# Patient Record
Sex: MALE | Race: WHITE | NOT HISPANIC OR LATINO | Employment: FULL TIME | URBAN - METROPOLITAN AREA
[De-identification: names, ages, dates, MRNs, and addresses within clinical notes are randomized per-mention and may not be internally consistent; named-entity substitution may affect disease eponyms.]

---

## 2024-05-13 ENCOUNTER — APPOINTMENT (OUTPATIENT)
Dept: GENERAL RADIOLOGY | Facility: HOSPITAL | Age: 19
End: 2024-05-13
Payer: COMMERCIAL

## 2024-05-13 ENCOUNTER — HOSPITAL ENCOUNTER (EMERGENCY)
Facility: HOSPITAL | Age: 19
Discharge: HOME | End: 2024-05-13
Attending: EMERGENCY MEDICINE | Admitting: EMERGENCY MEDICINE
Payer: COMMERCIAL

## 2024-05-13 VITALS
TEMPERATURE: 99.2 F | DIASTOLIC BLOOD PRESSURE: 81 MMHG | BODY MASS INDEX: 22.46 KG/M2 | HEIGHT: 74 IN | WEIGHT: 175 LBS | OXYGEN SATURATION: 98 % | SYSTOLIC BLOOD PRESSURE: 123 MMHG | HEART RATE: 74 BPM | RESPIRATION RATE: 16 BRPM

## 2024-05-13 DIAGNOSIS — R06.02 SHORTNESS OF BREATH: ICD-10-CM

## 2024-05-13 DIAGNOSIS — R09.1 PLEURISY: Primary | ICD-10-CM

## 2024-05-13 LAB
ALBUMIN SERPL-MCNC: 5.1 G/DL (ref 3.5–5.2)
ALBUMIN/GLOB SERPL: 2 G/DL
ALP SERPL-CCNC: 64 U/L (ref 39–117)
ALT SERPL W P-5'-P-CCNC: 15 U/L (ref 1–41)
ANION GAP SERPL CALCULATED.3IONS-SCNC: 14 MMOL/L (ref 5–15)
AST SERPL-CCNC: 16 U/L (ref 1–40)
BASOPHILS # BLD AUTO: 0.04 10*3/MM3 (ref 0–0.2)
BASOPHILS NFR BLD AUTO: 0.6 % (ref 0–1.5)
BILIRUB SERPL-MCNC: 1.6 MG/DL (ref 0–1.2)
BUN SERPL-MCNC: 9 MG/DL (ref 6–20)
BUN/CREAT SERPL: 11.3 (ref 7–25)
CALCIUM SPEC-SCNC: 9.7 MG/DL (ref 8.6–10.5)
CHLORIDE SERPL-SCNC: 104 MMOL/L (ref 98–107)
CO2 SERPL-SCNC: 22 MMOL/L (ref 22–29)
CREAT SERPL-MCNC: 0.8 MG/DL (ref 0.76–1.27)
D DIMER PPP FEU-MCNC: <0.27 MCGFEU/ML (ref 0–0.5)
DEPRECATED RDW RBC AUTO: 38.8 FL (ref 37–54)
EGFRCR SERPLBLD CKD-EPI 2021: 129.1 ML/MIN/1.73
EOSINOPHIL # BLD AUTO: 0.03 10*3/MM3 (ref 0–0.4)
EOSINOPHIL NFR BLD AUTO: 0.4 % (ref 0.3–6.2)
ERYTHROCYTE [DISTWIDTH] IN BLOOD BY AUTOMATED COUNT: 11.7 % (ref 12.3–15.4)
GEN 5 2HR TROPONIN T REFLEX: 7 NG/L
GLOBULIN UR ELPH-MCNC: 2.5 GM/DL
GLUCOSE SERPL-MCNC: 86 MG/DL (ref 65–99)
HCT VFR BLD AUTO: 44.5 % (ref 37.5–51)
HGB BLD-MCNC: 15.7 G/DL (ref 13–17.7)
HOLD SPECIMEN: NORMAL
HOLD SPECIMEN: NORMAL
IMM GRANULOCYTES # BLD AUTO: 0.02 10*3/MM3 (ref 0–0.05)
IMM GRANULOCYTES NFR BLD AUTO: 0.3 % (ref 0–0.5)
LYMPHOCYTES # BLD AUTO: 1.67 10*3/MM3 (ref 0.7–3.1)
LYMPHOCYTES NFR BLD AUTO: 23.1 % (ref 19.6–45.3)
MCH RBC QN AUTO: 32.3 PG (ref 26.6–33)
MCHC RBC AUTO-ENTMCNC: 35.3 G/DL (ref 31.5–35.7)
MCV RBC AUTO: 91.6 FL (ref 79–97)
MONOCYTES # BLD AUTO: 0.47 10*3/MM3 (ref 0.1–0.9)
MONOCYTES NFR BLD AUTO: 6.5 % (ref 5–12)
NEUTROPHILS NFR BLD AUTO: 5.01 10*3/MM3 (ref 1.7–7)
NEUTROPHILS NFR BLD AUTO: 69.1 % (ref 42.7–76)
NRBC BLD AUTO-RTO: 0 /100 WBC (ref 0–0.2)
PLATELET # BLD AUTO: 288 10*3/MM3 (ref 140–450)
PMV BLD AUTO: 10.1 FL (ref 6–12)
POTASSIUM SERPL-SCNC: 3.2 MMOL/L (ref 3.5–5.2)
PROT SERPL-MCNC: 7.6 G/DL (ref 6–8.5)
RBC # BLD AUTO: 4.86 10*6/MM3 (ref 4.14–5.8)
SODIUM SERPL-SCNC: 140 MMOL/L (ref 136–145)
TROPONIN T DELTA: 1 NG/L
TROPONIN T SERPL HS-MCNC: 6 NG/L
WBC NRBC COR # BLD AUTO: 7.24 10*3/MM3 (ref 3.4–10.8)
WHOLE BLOOD HOLD COAG: NORMAL
WHOLE BLOOD HOLD SPECIMEN: NORMAL

## 2024-05-13 PROCEDURE — 80053 COMPREHEN METABOLIC PANEL: CPT

## 2024-05-13 PROCEDURE — 71045 X-RAY EXAM CHEST 1 VIEW: CPT

## 2024-05-13 PROCEDURE — 25810000003 SODIUM CHLORIDE 0.9 % SOLUTION: Performed by: EMERGENCY MEDICINE

## 2024-05-13 PROCEDURE — 96374 THER/PROPH/DIAG INJ IV PUSH: CPT

## 2024-05-13 PROCEDURE — 99284 EMERGENCY DEPT VISIT MOD MDM: CPT

## 2024-05-13 PROCEDURE — 25010000002 KETOROLAC TROMETHAMINE PER 15 MG: Performed by: EMERGENCY MEDICINE

## 2024-05-13 PROCEDURE — 93010 ELECTROCARDIOGRAM REPORT: CPT | Performed by: INTERNAL MEDICINE

## 2024-05-13 PROCEDURE — 84484 ASSAY OF TROPONIN QUANT: CPT

## 2024-05-13 PROCEDURE — 36415 COLL VENOUS BLD VENIPUNCTURE: CPT

## 2024-05-13 PROCEDURE — 93005 ELECTROCARDIOGRAM TRACING: CPT

## 2024-05-13 PROCEDURE — 85025 COMPLETE CBC W/AUTO DIFF WBC: CPT

## 2024-05-13 PROCEDURE — 85379 FIBRIN DEGRADATION QUANT: CPT | Performed by: EMERGENCY MEDICINE

## 2024-05-13 RX ORDER — SODIUM CHLORIDE 0.9 % (FLUSH) 0.9 %
10 SYRINGE (ML) INJECTION AS NEEDED
Status: DISCONTINUED | OUTPATIENT
Start: 2024-05-13 | End: 2024-05-14 | Stop reason: HOSPADM

## 2024-05-13 RX ORDER — ASPIRIN 325 MG
325 TABLET ORAL ONCE
Status: DISCONTINUED | OUTPATIENT
Start: 2024-05-13 | End: 2024-05-14 | Stop reason: HOSPADM

## 2024-05-13 RX ORDER — KETOROLAC TROMETHAMINE 15 MG/ML
15 INJECTION, SOLUTION INTRAMUSCULAR; INTRAVENOUS ONCE
Status: COMPLETED | OUTPATIENT
Start: 2024-05-13 | End: 2024-05-13

## 2024-05-13 RX ADMIN — KETOROLAC TROMETHAMINE 15 MG: 15 INJECTION, SOLUTION INTRAMUSCULAR; INTRAVENOUS at 20:57

## 2024-05-13 RX ADMIN — SODIUM CHLORIDE 1000 ML: 9 INJECTION, SOLUTION INTRAVENOUS at 20:56

## 2024-05-13 NOTE — ED PROVIDER NOTES
EMERGENCY DEPARTMENT ENCOUNTER    Room Number:  15/15  PCP: Provider, No Known  Historian: Patient      HPI:  Chief Complaint: Chest pain  A complete HPI/ROS/PMH/PSH/SH/FH are unobtainable due to: None  Context: Mike Smith is a 19 y.o. male who presents to the ED c/o sudden onset of left-sided chest discomfort with associated shortness of breath that began roughly 24 hours ago.  He describes the pain as a sharp sensation that is made worse by deep breathing, cough, or sneezing.  He denies any recent illnesses, fever/chills, nausea/vomiting, back pain, extremity pain, or diaphoresis.            PAST MEDICAL HISTORY  Active Ambulatory Problems     Diagnosis Date Noted    No Active Ambulatory Problems     Resolved Ambulatory Problems     Diagnosis Date Noted    No Resolved Ambulatory Problems     No Additional Past Medical History         PAST SURGICAL HISTORY  History reviewed. No pertinent surgical history.      FAMILY HISTORY  History reviewed. No pertinent family history.      SOCIAL HISTORY  Social History     Socioeconomic History    Marital status: Single         ALLERGIES  Patient has no known allergies.        REVIEW OF SYSTEMS  Review of Systems   Constitutional:  Negative for activity change, appetite change and fever.   HENT:  Negative for congestion and sore throat.    Eyes: Negative.    Respiratory:  Positive for shortness of breath. Negative for cough.    Cardiovascular:  Positive for chest pain. Negative for leg swelling.   Gastrointestinal:  Negative for abdominal pain, diarrhea and vomiting.   Endocrine: Negative.    Genitourinary:  Negative for decreased urine volume and dysuria.   Musculoskeletal:  Negative for neck pain.   Skin:  Negative for rash and wound.   Allergic/Immunologic: Negative.    Neurological:  Negative for weakness, numbness and headaches.   Hematological: Negative.    Psychiatric/Behavioral: Negative.     All other systems reviewed and are negative.         PHYSICAL EXAM  ED  Triage Vitals [05/13/24 1847]   Temp Heart Rate Resp BP SpO2   99.2 °F (37.3 °C) 120 16 -- 99 %      Temp src Heart Rate Source Patient Position BP Location FiO2 (%)   -- -- -- -- --       Physical Exam  Constitutional:       General: He is not in acute distress.     Appearance: Normal appearance. He is not ill-appearing or toxic-appearing.   HENT:      Head: Normocephalic and atraumatic.   Eyes:      Extraocular Movements: Extraocular movements intact.      Pupils: Pupils are equal, round, and reactive to light.   Cardiovascular:      Rate and Rhythm: Normal rate and regular rhythm.      Heart sounds: No murmur heard.     No friction rub. No gallop.   Pulmonary:      Effort: Pulmonary effort is normal.      Breath sounds: Normal breath sounds.   Abdominal:      General: Abdomen is flat. There is no distension.      Palpations: Abdomen is soft.      Tenderness: There is no abdominal tenderness.   Musculoskeletal:         General: No swelling or tenderness. Normal range of motion.      Cervical back: Normal range of motion and neck supple.   Skin:     General: Skin is warm and dry.   Neurological:      General: No focal deficit present.      Mental Status: He is alert and oriented to person, place, and time.      Sensory: No sensory deficit.      Motor: No weakness.   Psychiatric:         Mood and Affect: Mood normal.         Behavior: Behavior normal.         Vital signs and nursing notes reviewed.          LAB RESULTS  Recent Results (from the past 24 hour(s))   ECG 12 Lead ED Triage Standing Order; Chest Pain    Collection Time: 05/13/24  6:41 PM   Result Value Ref Range    QT Interval 369 ms    QTC Interval 431 ms   Comprehensive Metabolic Panel    Collection Time: 05/13/24  7:01 PM    Specimen: Blood   Result Value Ref Range    Glucose 86 65 - 99 mg/dL    BUN 9 6 - 20 mg/dL    Creatinine 0.80 0.76 - 1.27 mg/dL    Sodium 140 136 - 145 mmol/L    Potassium 3.2 (L) 3.5 - 5.2 mmol/L    Chloride 104 98 - 107 mmol/L     CO2 22.0 22.0 - 29.0 mmol/L    Calcium 9.7 8.6 - 10.5 mg/dL    Total Protein 7.6 6.0 - 8.5 g/dL    Albumin 5.1 3.5 - 5.2 g/dL    ALT (SGPT) 15 1 - 41 U/L    AST (SGOT) 16 1 - 40 U/L    Alkaline Phosphatase 64 39 - 117 U/L    Total Bilirubin 1.6 (H) 0.0 - 1.2 mg/dL    Globulin 2.5 gm/dL    A/G Ratio 2.0 g/dL    BUN/Creatinine Ratio 11.3 7.0 - 25.0    Anion Gap 14.0 5.0 - 15.0 mmol/L    eGFR 129.1 >60.0 mL/min/1.73   High Sensitivity Troponin T    Collection Time: 05/13/24  7:01 PM    Specimen: Blood   Result Value Ref Range    HS Troponin T 6 <22 ng/L   Green Top (Gel)    Collection Time: 05/13/24  7:01 PM   Result Value Ref Range    Extra Tube Hold for add-ons.    Lavender Top    Collection Time: 05/13/24  7:01 PM   Result Value Ref Range    Extra Tube hold for add-on    Gold Top - SST    Collection Time: 05/13/24  7:01 PM   Result Value Ref Range    Extra Tube Hold for add-ons.    Light Blue Top    Collection Time: 05/13/24  7:01 PM   Result Value Ref Range    Extra Tube Hold for add-ons.    CBC Auto Differential    Collection Time: 05/13/24  7:01 PM    Specimen: Blood   Result Value Ref Range    WBC 7.24 3.40 - 10.80 10*3/mm3    RBC 4.86 4.14 - 5.80 10*6/mm3    Hemoglobin 15.7 13.0 - 17.7 g/dL    Hematocrit 44.5 37.5 - 51.0 %    MCV 91.6 79.0 - 97.0 fL    MCH 32.3 26.6 - 33.0 pg    MCHC 35.3 31.5 - 35.7 g/dL    RDW 11.7 (L) 12.3 - 15.4 %    RDW-SD 38.8 37.0 - 54.0 fl    MPV 10.1 6.0 - 12.0 fL    Platelets 288 140 - 450 10*3/mm3    Neutrophil % 69.1 42.7 - 76.0 %    Lymphocyte % 23.1 19.6 - 45.3 %    Monocyte % 6.5 5.0 - 12.0 %    Eosinophil % 0.4 0.3 - 6.2 %    Basophil % 0.6 0.0 - 1.5 %    Immature Grans % 0.3 0.0 - 0.5 %    Neutrophils, Absolute 5.01 1.70 - 7.00 10*3/mm3    Lymphocytes, Absolute 1.67 0.70 - 3.10 10*3/mm3    Monocytes, Absolute 0.47 0.10 - 0.90 10*3/mm3    Eosinophils, Absolute 0.03 0.00 - 0.40 10*3/mm3    Basophils, Absolute 0.04 0.00 - 0.20 10*3/mm3    Immature Grans, Absolute 0.02 0.00 - 0.05  10*3/mm3    nRBC 0.0 0.0 - 0.2 /100 WBC   D-dimer, Quantitative    Collection Time: 05/13/24  7:01 PM    Specimen: Blood   Result Value Ref Range    D-Dimer, Quantitative <0.27 0.00 - 0.50 MCGFEU/mL   High Sensitivity Troponin T 2Hr    Collection Time: 05/13/24  8:56 PM    Specimen: Blood   Result Value Ref Range    HS Troponin T 7 <22 ng/L    Troponin T Delta 1 >=-4 - <+4 ng/L       Ordered the above labs and reviewed the results.        RADIOLOGY  XR Chest 1 View    Result Date: 5/13/2024  XR CHEST 1 VW-  Clinical: Chest pain  FINDINGS: Heart size within normal limits, no mediastinal or hilar abnormality. The lungs are clear.  CONCLUSION: No active disease of the chest  This report was finalized on 5/13/2024 7:45 PM by Dr. Cornelius Mora M.D on Workstation: [id number]       Ordered the above noted radiological studies. Reviewed by me in PACS.            PROCEDURES  Procedures    EKG independently interpreted by myself as follows:    EKG          EKG time: 1841  Rhythm/Rate: NSR, 82  P waves and KS: nml  QRS, axis: nml, nml   ST and T waves: nml     Interpreted Contemporaneously by me, independently viewed  No previous EKG available for comparison        HEART SCORE    History Slightly or non-suspicious (0)  ECG Normal (0)  Age < or = 45 (0)  Risk factors No risk factors (0)  Troponin < or = Normal limit (0)    This patient's HEART score is 0    HEART Score Key:  Scores 0-3: 0.9-1.7% risk of adverse cardiac event. In the HEART Score study, these patients were discharged (0.99% in the retrospective study, 1.7% in the prospective study)  Scores 4-6: 12-16.6% risk of adverse cardiac event. In the HEART Score study, these patients were admitted to the hospital. (11.6% retrospective, 16.6% prospective)  Scores ?7: 50-65% risk of adverse cardiac event. In the HEART Score study, these patients were candidates for early invasive measures. (65.2% retrospective, 50.1% prospective)      MEDICATIONS GIVEN IN ER  Medications    sodium chloride 0.9 % flush 10 mL (has no administration in time range)   aspirin tablet 325 mg (325 mg Oral Not Given 5/13/24 2014)   sodium chloride 0.9 % flush 10 mL (has no administration in time range)   sodium chloride 0.9 % bolus 1,000 mL (1,000 mL Intravenous New Bag 5/13/24 2056)   ketorolac (TORADOL) injection 15 mg (15 mg Intravenous Given 5/13/24 2057)                   MEDICAL DECISION MAKING, PROGRESS, and CONSULTS    All labs have been independently reviewed by me.  All radiology studies have been reviewed by me and I have also reviewed the radiology report.   EKG's independently viewed and interpreted by me.  Discussion below represents my analysis of pertinent findings related to patient's condition, differential diagnosis, treatment plan and final disposition.      Additional sources:  - Discussed/ obtained information from independent historians: History obtained from the patient as well as the patient's family at bedside.    - External (non-ED) record review: Upon medical records review, the patient was last seen and evaluated in the outpatient office of urgent care on 9/27/2022 for left foot pain.    - Chronic or social conditions impacting care: None reported    - Shared decision making: Discharge decision based on shared conversations have between myself as well as the patient and the patient's family at bedside.      Orders placed during this visit:  Orders Placed This Encounter   Procedures    XR Chest 1 View    Anthony Draw    Comprehensive Metabolic Panel    High Sensitivity Troponin T    CBC Auto Differential    D-dimer, Quantitative    High Sensitivity Troponin T 2Hr    NPO Diet NPO Type: Strict NPO    Undress & Gown    Continuous Pulse Oximetry    Oxygen Therapy- Nasal Cannula; Titrate 1-6 LPM Per SpO2; 90 - 95%    ECG 12 Lead ED Triage Standing Order; Chest Pain    ECG 12 Lead ED Triage Standing Order; Chest Pain    Insert Peripheral IV    Insert Peripheral IV    CBC & Differential     Green Top (Gel)    Lavender Top    Gold Top - SST    Light Blue Top           Differential diagnosis includes but is not limited to:    Acute coronary syndrome, GERD/gastritis, peptic ulcer disease, cardiac rhythm disturbance, pneumonia, pneumothorax, pleurisy, pulmonary embolism, or pericarditis      Independent interpretation of labs, radiology studies, and discussions with consultants:    Chest x-ray was independently interpreted by myself with my interpretation showing no cardiomegaly nor area of edema, infiltrate, or pneumothorax.      ED Course as of 05/13/24 2208   Mon May 13, 2024   2206 On reevaluation, the patient reports resolution of his chest discomfort following the administration of IV Toradol.  I informed the patient and family that is labs, EKG, as well as chest x-ray are all unremarkable.  His symptoms are most likely consistent with pleurisy.  At this point, the patient is stable for discharge.  I have given instructions for oral ibuprofen use should this come back and be needed.  All questions answered. [BM]      ED Course User Index  [BM] Javier Gambino MD           DIAGNOSIS  Final diagnoses:   Pleurisy   Shortness of breath         DISPOSITION  DISCHARGE    Patient discharged in stable condition.    Reviewed implications of results, diagnosis, meds, responsibility to follow up, warning signs and symptoms of possible worsening, potential complications and reasons to return to ER.    Patient/Family voiced understanding of above instructions.    Discussed plan for discharge, as there is no emergent indication for admission. Patient referred to primary care provider for BP management due to today's BP. Pt/family is agreeable and understands need for follow up and repeat testing.  Pt is aware that discharge does not mean that nothing is wrong but it indicates no emergency is present that requires admission and they must continue care with follow-up as given below or physician of their  choice.     FOLLOW-UP  [facility]  [facility] [id number]  [phone]  Schedule an appointment as soon as possible for a visit            Medication List      No changes were made to your prescriptions during this visit.                   Latest Documented Vital Signs:  As of 22:08 EDT  BP- 127/74 HR- 64 Temp- 99.2 °F (37.3 °C) O2 sat- 98%              --    Please note that portions of this were completed with a voice recognition program.       Note Disclaimer: At [facility], we believe that sharing information builds trust and better relationships. You are receiving this note because you are receiving care at [facility] or recently visited. It is possible you will see health information before a provider has talked with you about it. This kind of information can be easy to misunderstand. To help you fully understand what it means for your health, we urge you to discuss this note with your provider.             Javier Gambino MD  05/13/24 9361

## 2024-05-14 LAB
QT INTERVAL: 369 MS
QTC INTERVAL: 431 MS

## 2025-02-24 ENCOUNTER — OFFICE VISIT (OUTPATIENT)
Dept: INTERNAL MEDICINE | Facility: CLINIC | Age: 20
End: 2025-02-24
Payer: COMMERCIAL

## 2025-02-24 VITALS
OXYGEN SATURATION: 99 % | WEIGHT: 221 LBS | HEART RATE: 98 BPM | TEMPERATURE: 97.1 F | HEIGHT: 74 IN | BODY MASS INDEX: 28.36 KG/M2 | DIASTOLIC BLOOD PRESSURE: 80 MMHG | SYSTOLIC BLOOD PRESSURE: 118 MMHG

## 2025-02-24 DIAGNOSIS — R20.2 NUMBNESS AND TINGLING OF BOTH FEET: ICD-10-CM

## 2025-02-24 DIAGNOSIS — R20.0 NUMBNESS AND TINGLING OF BOTH FEET: ICD-10-CM

## 2025-02-24 DIAGNOSIS — Z76.89 ENCOUNTER TO ESTABLISH CARE: Primary | ICD-10-CM

## 2025-02-24 DIAGNOSIS — F34.1 PERSISTENT DEPRESSIVE DISORDER: ICD-10-CM

## 2025-02-24 DIAGNOSIS — F41.9 ANXIETY: ICD-10-CM

## 2025-02-24 DIAGNOSIS — G43.E09 CHRONIC MIGRAINE WITH AURA WITHOUT STATUS MIGRAINOSUS, NOT INTRACTABLE: ICD-10-CM

## 2025-02-24 PROCEDURE — 99204 OFFICE O/P NEW MOD 45 MIN: CPT

## 2025-02-24 RX ORDER — ARIPIPRAZOLE 10 MG/1
10 TABLET ORAL DAILY
COMMUNITY
Start: 2025-01-24

## 2025-02-24 RX ORDER — VILAZODONE HYDROCHLORIDE 10 MG/1
10 TABLET ORAL DAILY
COMMUNITY
Start: 2025-01-23

## 2025-02-24 RX ORDER — SUMATRIPTAN SUCCINATE 25 MG/1
TABLET ORAL
Qty: 30 TABLET | Refills: 3 | Status: SHIPPED | OUTPATIENT
Start: 2025-02-24

## 2025-02-24 RX ORDER — BUSPIRONE HYDROCHLORIDE 15 MG/1
15 TABLET ORAL DAILY
COMMUNITY
Start: 2025-01-23

## 2025-02-24 NOTE — PROGRESS NOTES
Chief Complaint  Establish Care (Last PCP with peds office ) and Headache (Behind left eye - daily x1.5 month, tried napping, lights off, OTC meds suppress for short time and then comes back. No injury, mom has migraines 15+ meds )     Subjective:      History of Present Illness {CC  Problem List  Visit  Diagnosis   Encounters  Notes  Medications  Labs  Result Review Imaging  Media :23}     Mike Smith presents to [facility] for:    Patient or patient representative verbalized consent for the use of Ambient Listening during the visit with  JEWEL Enriquez for chart documentation. 2/24/2025  10:23 EST     History of Present Illness      The patient is here today to establish care.    He has been experiencing migraines for the past 1.5 months, which began abruptly upon awakening from a nap. The migraines have been persistent and localized to the same area. He reports no associated visual, auditory, or olfactory disturbances. Over the past 4 days, he has experienced nausea, although he is uncertain if this is related to his migraines or a dietary cause. He does not experience sinus drainage. He admits to poor posture due to extensive video erin but reports no neck stiffness. He has not had a recent eye examination. He has not previously tried any migraine-specific medications.    He is currently under the care of Cinda at the [facility] for anxiety and depression. He adheres to his medication regimen without any missed doses.    He also reports intermittent numbness in his feet and recent onset of leg tremors. His appetite remains normal, and he has consumed food today.    Supplemental Information  He was hospitalized for pleurisy in May 2024.    SOCIAL HISTORY  He is currently unemployed and not enrolled in school.    IMMUNIZATIONS  He is due for Tdap vaccine.     Past Medical History:   Diagnosis Date    Anxiety     Depression      Family History   Problem Relation  Age of Onset    Anxiety disorder Mother     Depression Mother     Mental illness Mother     Anxiety disorder Sister     Depression Sister     Mental illness Sister     Anxiety disorder Brother     Depression Brother     Mental illness Brother     Diabetes Maternal Grandfather     Heart disease Maternal Grandfather     Hyperlipidemia Maternal Grandfather     Kidney disease Maternal Grandfather     Liver disease Maternal Grandfather     Heart disease Paternal Grandfather      Social History     Tobacco Use   Smoking Status Never   Smokeless Tobacco Never     Social History     Substance and Sexual Activity   Alcohol Use Not Currently         I have reviewed patient's medical history, any new submitted information provided by patient or medical assistant and updated medical record.      Objective:      Physical Exam  Vitals reviewed.   Constitutional:       General: He is awake. He is not in acute distress.     Appearance: Normal appearance. He is not ill-appearing, toxic-appearing or diaphoretic.   HENT:      Head: Normocephalic.      Right Ear: Hearing normal.      Left Ear: Hearing normal.      Nose: Nose normal.      Mouth/Throat:      Lips: Pink.      Mouth: Mucous membranes are moist.   Eyes:      General: Lids are normal. Vision grossly intact.      Conjunctiva/sclera: Conjunctivae normal.   Cardiovascular:      Rate and Rhythm: Normal rate and regular rhythm.      Pulses: Normal pulses.      Heart sounds: Normal heart sounds.   Pulmonary:      Effort: Pulmonary effort is normal.      Breath sounds: Normal breath sounds.   Skin:     General: Skin is warm and dry.      Capillary Refill: Capillary refill takes less than 2 seconds.   Neurological:      General: No focal deficit present.      Mental Status: He is alert and oriented to person, place, and time. Mental status is at baseline.   Psychiatric:         Attention and Perception: Attention and perception normal.         Mood and Affect: Mood and affect  "normal.         Speech: Speech normal.         Behavior: Behavior normal. Behavior is cooperative.         Cognition and Memory: Cognition and memory normal.         Judgment: Judgment normal.        Result Review  Data Reviewed:{ Labs  Result Review  Imaging  Med Tab  Media :23}     Results  Laboratory Studies  Potassium was low in May 2024.       The following data was reviewed by: JEWEL Enriquez on 02/24/2025  Common labs          5/13/2024    19:01   Common Labs   Glucose 86    BUN 9    Creatinine 0.80    Sodium 140    Potassium 3.2    Chloride 104    Calcium 9.7    Albumin 5.1    Total Bilirubin 1.6    Alkaline Phosphatase 64    AST (SGOT) 16    ALT (SGPT) 15    WBC 7.24    Hemoglobin 15.7    Hematocrit 44.5    Platelets 288             Vital Signs:   /80 (BP Location: Left arm, Patient Position: Sitting, Cuff Size: Adult)   Pulse 98   Temp 97.1 °F (36.2 °C) (Temporal)   Ht 188 cm (74.02\")   Wt 100 kg (221 lb)   SpO2 99%   BMI 28.36 kg/m²           Requested Prescriptions     Signed Prescriptions Disp Refills    SUMAtriptan (Imitrex) 25 MG tablet 30 tablet 3     Sig: Take one tablet at onset of headache. May repeat dose one time in 2 hours if headache not relieved.       Routine medications provided by this office will also be refilled via pharmacy request.       Current Outpatient Medications:     ARIPiprazole (ABILIFY) 10 MG tablet, Take 1 tablet by mouth Daily., Disp: , Rfl:     busPIRone (BUSPAR) 15 MG tablet, Take 1 tablet by mouth Daily., Disp: , Rfl:     vilazodone (VIIBRYD) 10 MG tablet tablet, Take 1 tablet by mouth Daily., Disp: , Rfl:     SUMAtriptan (Imitrex) 25 MG tablet, Take one tablet at onset of headache. May repeat dose one time in 2 hours if headache not relieved., Disp: 30 tablet, Rfl: 3     Assessment and Plan:      Assessment and Plan {CC Problem List  Visit Diagnosis  ROS  Review (Popup)  Health Maintenance  Quality  BestPractice  Medications  SmartSets  " SnapShot Encounters  Media :23}     Problem List Items Addressed This Visit       Anxiety    Depression    Relevant Medications    ARIPiprazole (ABILIFY) 10 MG tablet    busPIRone (BUSPAR) 15 MG tablet    vilazodone (VIIBRYD) 10 MG tablet tablet     Other Visit Diagnoses       Encounter to establish care    -  Primary    Chronic migraine with aura without status migrainosus, not intractable        Referral to neurology placed today    Relevant Medications    vilazodone (VIIBRYD) 10 MG tablet tablet    SUMAtriptan (Imitrex) 25 MG tablet    Other Relevant Orders    Ambulatory Referral to Neurology    MRI Brain Without Contrast    Numbness and tingling of both feet        Labs will be collected at your next visit.  Please come fasting at least 6 to 8 hours prior to appointment                 1. Migraines.  He has a history of migraines starting about a month and a half ago, with constant headaches localized in the same spot. He reports no vision, hearing, or smell changes associated with the headaches. He has not tried any migraine medications before. Sumatriptan will be prescribed and sent to [facility] pharmacy to see if it helps. An MRI will be ordered to rule out other potential causes. He is advised to get his eyes checked as vision changes can contribute to migraines. Using a blue blocker screen for erin is recommended to reduce eye strain and potentially alleviate migraine symptoms. A referral to neurology will be made for further evaluation.    2. Anxiety and Depression.  He is currently seeing Cinda at the [facility] for anxiety and depression and reports satisfaction with the care provided.    3. Numbness in feet and shaky legs.  He reports that his feet randomly go numb and his legs have been shaky recently. These symptoms will be monitored, and further evaluation will be conducted during the annual physical.    4. Health Maintenance.  He is due for an annual physical, which will include a comprehensive set of  lab tests. He is also due for a Tdap vaccine, which can be administered during the next visit or at a pharmacy. A one-time hepatitis C screening will be conducted along with the other lab tests. He is advised to fast for 6 to 8 hours before the lab work.      Today we have placed a referral or ordered further testing:     A team member from Psychiatric will contact you within the next 3-5 business days to schedule your tests or referral.    If you have not heard them within this time frame, they can be contacted at [phone]    If it was an outside referral: contact our office if you have not been contacted for appointment after 7-10 business days.      Please come fasting to your next visit at least 6 to 8 hours prior to appointment.    Thank you for allowing us to care for you,  JEWEL Enriquez      Follow-up  The patient will follow up in a week for his annual physical.       Follow Up {Instructions Charge Capture  Follow-up Communications :23}     Return in about 1 week (around 3/3/2025) for   Annual physical.      Patient was given instructions and counseling regarding his condition or for health maintenance advice. Please see specific information pulled into the AVS if appropriate.        Dragon disclaimer:   Much of this encounter note is an electronic transcription/translation of spoken language to printed text. The electronic translation of spoken language may permit erroneous, or at times, nonsensical words or phrases to be inadvertently transcribed; Although I have reviewed the note for such errors, some may still exist.     Additional Patient Counseling:       Patient Instructions     Sumatriptan Tablets  What is this medication?  SUMATRIPTAN (juan c ma TRIP tan) treats migraines. It works by blocking pain signals and narrowing blood vessels in the brain. It belongs to a group of medications called triptans. It is not used to prevent migraines.  This medicine may be used for  other purposes; ask your health care provider or pharmacist if you have questions.  COMMON BRAND NAME(S): Imitrex, Migraine Pack  What should I tell my care team before I take this medication?  They need to know if you have any of these conditions:  Circulation problems in fingers and toes  Diabetes  Heart disease  High blood pressure  High cholesterol  History of irregular heartbeat  History of stroke  Kidney disease  Liver disease  Stomach or intestine problems  Tobacco use  An unusual or allergic reaction to sumatriptan, other medications, foods, dyes, or preservatives  Pregnant or trying to get pregnant  Breastfeeding  How should I use this medication?  Take this medication by mouth with a glass of water. Follow the directions on the prescription label. Do not take it more often than directed.  Talk to your care team about the use of this medication in children. Special care may be needed.  Overdosage: If you think you have taken too much of this medicine contact a poison control center or emergency room at once.  NOTE: This medicine is only for you. Do not share this medicine with others.  What if I miss a dose?  This does not apply. This medication is not for regular use.  What may interact with this medication?  Do not take this medication with any of the following:  Certain medications for migraine headache, such as almotriptan, eletriptan, frovatriptan, naratriptan, rizatriptan, sumatriptan, zolmitriptan  Ergot alkaloids, such as dihydroergotamine, ergonovine, ergotamine, methylergonovine  MAOIs, such as Carbex, Eldepryl, Marplan, Nardil, and Parnate  This medication may also interact with the following:  Certain medications for mental health conditions  This list may not describe all possible interactions. Give your health care provider a list of all the medicines, herbs, non-prescription drugs, or dietary supplements you use. Also tell them if you smoke, drink alcohol, or use illegal drugs. Some items may  interact with your medicine.  What should I watch for while using this medication?  Visit your care team for regular checks on your progress. Tell your care team if your symptoms do not start to get better or if they get worse.  This medication may affect your coordination, reaction time, or judgment. Do not drive or operate machinery until you know how this medication affects you. Sit up or stand slowly to reduce the risk of dizzy or fainting spells. Drinking alcohol with this medication can increase the risk of these side effects.  Tell your care team right away if you have any change in your eyesight.  If you take migraine medications for 10 or more days a month, your migraines may get worse. Keep a diary of headache days and medication use. Contact your care team if your migraine attacks occur more frequently.  What side effects may I notice from receiving this medication?  Side effects that you should report to your care team as soon as possible:  Allergic reactions--skin rash, itching, hives, swelling of the face, lips, tongue, or throat  Burning, pain, tingling, or color changes in the legs or feet  Heart attack--pain or tightness in the chest, shoulders, arms, or jaw, nausea, shortness of breath, cold or clammy skin, feeling faint or lightheaded  Heart rhythm changes--fast or irregular heartbeat, dizziness, feeling faint or lightheaded, chest pain, trouble breathing  Increase in blood pressure  Raynaud's--cool, numb, or painful fingers or toes that may change color from pale, to blue, to red  Seizures  Serotonin syndrome--irritability, confusion, fast or irregular heartbeat, muscle stiffness, twitching muscles, sweating, high fever, seizure, chills, vomiting, diarrhea  Stroke--sudden numbness or weakness of the face, arm, or leg, trouble speaking, confusion, trouble walking, loss of balance or coordination, dizziness, severe headache, change in vision  Sudden or severe stomach pain, nausea, vomiting, fever,  or bloody diarrhea  Vision loss  Side effects that usually do not require medical attention (report to your care team if they continue or are bothersome):  Dizziness  General discomfort or fatigue  This list may not describe all possible side effects. Call your doctor for medical advice about side effects. You may report side effects to FDA at 4-281-MAC-6336.  Where should I keep my medication?  Keep out of the reach of children and pets.  Store at room temperature between 2 and 30 degrees C (36 and 86 degrees F). Throw away any unused medication after the expiration date.  NOTE: This sheet is a summary. It may not cover all possible information. If you have questions about this medicine, talk to your doctor, pharmacist, or health care provider.  © 2024 [employer]/Gold Standard (2023-10-24 00:00:00)     Migraine Headache  A migraine headache is a very strong throbbing pain on one or both sides of your head. This type of headache can also cause other symptoms. It can last from 4 hours to 3 days. Talk with your doctor about what things may bring on (trigger) this condition.  What are the causes?  The exact cause of a migraine is not known. This condition may be brought on or caused by:  Smoking.  Medicines, such as:  Medicine used to treat chest pain (nitroglycerin).  Birth control pills.  Estrogen.  Some blood pressure medicines.  Certain substances in some foods or drinks.  Foods and drinks, such as:  Cheese.  Chocolate.  Alcohol.  Caffeine.  Doing physical activity that is very hard.  Other things that may trigger a migraine headache include:  Periods.  Pregnancy.  Hunger.  Stress.  Getting too much or too little sleep.  Weather changes.  Feeling tired (fatigue).  What increases the risk?  Being 25-55 years old.  Being female.  Having a family history of migraine headaches.  Being .  Having a mental health condition, such as being sad (depressed) or feeling worried or nervous (anxious).  Being very  overweight (obese).  What are the signs or symptoms?  A throbbing pain. This pain may:  Happen in any area of the head, such as on one or both sides.  Make it hard to do daily activities.  Get worse with physical activity.  Get worse around bright lights, loud noises, or smells.  Other symptoms may include:  Feeling like you may vomit (nauseous).  Vomiting.  Dizziness.  Before a migraine headache starts, you may get warning signs (an aura). An aura may include:  Seeing flashing lights or having blind spots.  Seeing bright spots, halos, or zigzag lines.  Having tunnel vision or blurred vision.  Having numbness or a tingling feeling.  Having trouble talking.  Having weak muscles.  After a migraine ends, you may have symptoms. These may include:  Tiredness.  Trouble thinking (concentrating).  How is this treated?  Taking medicines that:  Relieve pain.  Relieve the feeling like you may vomit.  Prevent migraine headaches.  Treatment may also include:  Acupuncture.  Lifestyle changes like avoiding foods that bring on migraine headaches.  Learning ways to control your body functions (biofeedback).  Therapy to help you know and deal with negative thoughts (cognitive behavioral therapy).  Follow these instructions at home:  Medicines  Take over-the-counter and prescription medicines only as told by your doctor.  If told, take steps to prevent problems with pooping (constipation). You may need to:  Drink enough fluid to keep your pee (urine) pale yellow.  Take medicines. You will be told what medicines to take.  Eat foods that are high in fiber. These include beans, whole grains, and fresh fruits and vegetables.  Limit foods that are high in fat and sugar. These include fried or sweet foods.  Ask your doctor if you should avoid driving or using machines while you are taking your medicine.  Lifestyle    Do not drink alcohol.  Do not smoke or use any products that contain nicotine or tobacco. If you need help quitting, ask your  doctor.  Get 7-9 hours of sleep each night, or the amount recommended by your doctor.  Find ways to deal with stress, such as meditation, deep breathing, or yoga.  Try to exercise often. This can help lessen how bad and how often your migraines happen.  General instructions  Keep a journal to find out what may bring on your migraine headaches. This can help you avoid those things. For example, write down:  What you eat and drink.  How much sleep you get.  Any change to your medicines or diet.  If you have a migraine headache:  Avoid things that make your symptoms worse, such as bright lights.  Lie down in a dark, quiet room.  Do not drive or use machinery.  Ask your doctor what activities are safe for you.  Where to find more information  Coalition for Headache and Migraine Patients (CHAMP): headachemigraine.org  American Migraine Foundation: americanmigrainefoundation.org  National Headache Foundation: headaches.org  Contact a doctor if:  You get a migraine headache that is different or worse than others you have had.  You have more than 15 days of headaches in one month.  Get help right away if:  Your migraine headache gets very bad.  Your migraine headache lasts more than 72 hours.  You have a fever or stiff neck.  You have trouble seeing.  Your muscles feel weak or like you cannot control them.  You lose your balance a lot.  You have trouble walking.  You faint.  You have a seizure.  This information is not intended to replace advice given to you by your health care provider. Make sure you discuss any questions you have with your health care provider.  Document Revised: 08/14/2023 Document Reviewed: 08/14/2023  Elsevier Patient Education © 2024 [employer]

## 2025-02-24 NOTE — PATIENT INSTRUCTIONS
Sumatriptan Tablets  What is this medication?  SUMATRIPTAN (juan c ma TRIP tan) treats migraines. It works by blocking pain signals and narrowing blood vessels in the brain. It belongs to a group of medications called triptans. It is not used to prevent migraines.  This medicine may be used for other purposes; ask your health care provider or pharmacist if you have questions.  COMMON BRAND NAME(S): Imitrex, Migraine Pack  What should I tell my care team before I take this medication?  They need to know if you have any of these conditions:  Circulation problems in fingers and toes  Diabetes  Heart disease  High blood pressure  High cholesterol  History of irregular heartbeat  History of stroke  Kidney disease  Liver disease  Stomach or intestine problems  Tobacco use  An unusual or allergic reaction to sumatriptan, other medications, foods, dyes, or preservatives  Pregnant or trying to get pregnant  Breastfeeding  How should I use this medication?  Take this medication by mouth with a glass of water. Follow the directions on the prescription label. Do not take it more often than directed.  Talk to your care team about the use of this medication in children. Special care may be needed.  Overdosage: If you think you have taken too much of this medicine contact a poison control center or emergency room at once.  NOTE: This medicine is only for you. Do not share this medicine with others.  What if I miss a dose?  This does not apply. This medication is not for regular use.  What may interact with this medication?  Do not take this medication with any of the following:  Certain medications for migraine headache, such as almotriptan, eletriptan, frovatriptan, naratriptan, rizatriptan, sumatriptan, zolmitriptan  Ergot alkaloids, such as dihydroergotamine, ergonovine, ergotamine, methylergonovine  MAOIs, such as Carbex, Eldepryl, Marplan, Nardil, and Parnate  This medication may also interact with the following:  Certain  medications for mental health conditions  This list may not describe all possible interactions. Give your health care provider a list of all the medicines, herbs, non-prescription drugs, or dietary supplements you use. Also tell them if you smoke, drink alcohol, or use illegal drugs. Some items may interact with your medicine.  What should I watch for while using this medication?  Visit your care team for regular checks on your progress. Tell your care team if your symptoms do not start to get better or if they get worse.  This medication may affect your coordination, reaction time, or judgment. Do not drive or operate machinery until you know how this medication affects you. Sit up or stand slowly to reduce the risk of dizzy or fainting spells. Drinking alcohol with this medication can increase the risk of these side effects.  Tell your care team right away if you have any change in your eyesight.  If you take migraine medications for 10 or more days a month, your migraines may get worse. Keep a diary of headache days and medication use. Contact your care team if your migraine attacks occur more frequently.  What side effects may I notice from receiving this medication?  Side effects that you should report to your care team as soon as possible:  Allergic reactions--skin rash, itching, hives, swelling of the face, lips, tongue, or throat  Burning, pain, tingling, or color changes in the legs or feet  Heart attack--pain or tightness in the chest, shoulders, arms, or jaw, nausea, shortness of breath, cold or clammy skin, feeling faint or lightheaded  Heart rhythm changes--fast or irregular heartbeat, dizziness, feeling faint or lightheaded, chest pain, trouble breathing  Increase in blood pressure  Raynaud's--cool, numb, or painful fingers or toes that may change color from pale, to blue, to red  Seizures  Serotonin syndrome--irritability, confusion, fast or irregular heartbeat, muscle stiffness, twitching muscles,  sweating, high fever, seizure, chills, vomiting, diarrhea  Stroke--sudden numbness or weakness of the face, arm, or leg, trouble speaking, confusion, trouble walking, loss of balance or coordination, dizziness, severe headache, change in vision  Sudden or severe stomach pain, nausea, vomiting, fever, or bloody diarrhea  Vision loss  Side effects that usually do not require medical attention (report to your care team if they continue or are bothersome):  Dizziness  General discomfort or fatigue  This list may not describe all possible side effects. Call your doctor for medical advice about side effects. You may report side effects to FDA at 3-713-SHS-2874.  Where should I keep my medication?  Keep out of the reach of children and pets.  Store at room temperature between 2 and 30 degrees C (36 and 86 degrees F). Throw away any unused medication after the expiration date.  NOTE: This sheet is a summary. It may not cover all possible information. If you have questions about this medicine, talk to your doctor, pharmacist, or health care provider.  © 2024 [employer] Standard (2023-10-24 00:00:00)     Migraine Headache  A migraine headache is a very strong throbbing pain on one or both sides of your head. This type of headache can also cause other symptoms. It can last from 4 hours to 3 days. Talk with your doctor about what things may bring on (trigger) this condition.  What are the causes?  The exact cause of a migraine is not known. This condition may be brought on or caused by:  Smoking.  Medicines, such as:  Medicine used to treat chest pain (nitroglycerin).  Birth control pills.  Estrogen.  Some blood pressure medicines.  Certain substances in some foods or drinks.  Foods and drinks, such as:  Cheese.  Chocolate.  Alcohol.  Caffeine.  Doing physical activity that is very hard.  Other things that may trigger a migraine headache include:  Periods.  Pregnancy.  Hunger.  Stress.  Getting too much or too little  sleep.  Weather changes.  Feeling tired (fatigue).  What increases the risk?  Being 25-55 years old.  Being female.  Having a family history of migraine headaches.  Being .  Having a mental health condition, such as being sad (depressed) or feeling worried or nervous (anxious).  Being very overweight (obese).  What are the signs or symptoms?  A throbbing pain. This pain may:  Happen in any area of the head, such as on one or both sides.  Make it hard to do daily activities.  Get worse with physical activity.  Get worse around bright lights, loud noises, or smells.  Other symptoms may include:  Feeling like you may vomit (nauseous).  Vomiting.  Dizziness.  Before a migraine headache starts, you may get warning signs (an aura). An aura may include:  Seeing flashing lights or having blind spots.  Seeing bright spots, halos, or zigzag lines.  Having tunnel vision or blurred vision.  Having numbness or a tingling feeling.  Having trouble talking.  Having weak muscles.  After a migraine ends, you may have symptoms. These may include:  Tiredness.  Trouble thinking (concentrating).  How is this treated?  Taking medicines that:  Relieve pain.  Relieve the feeling like you may vomit.  Prevent migraine headaches.  Treatment may also include:  Acupuncture.  Lifestyle changes like avoiding foods that bring on migraine headaches.  Learning ways to control your body functions (biofeedback).  Therapy to help you know and deal with negative thoughts (cognitive behavioral therapy).  Follow these instructions at home:  Medicines  Take over-the-counter and prescription medicines only as told by your doctor.  If told, take steps to prevent problems with pooping (constipation). You may need to:  Drink enough fluid to keep your pee (urine) pale yellow.  Take medicines. You will be told what medicines to take.  Eat foods that are high in fiber. These include beans, whole grains, and fresh fruits and vegetables.  Limit foods that  are high in fat and sugar. These include fried or sweet foods.  Ask your doctor if you should avoid driving or using machines while you are taking your medicine.  Lifestyle    Do not drink alcohol.  Do not smoke or use any products that contain nicotine or tobacco. If you need help quitting, ask your doctor.  Get 7-9 hours of sleep each night, or the amount recommended by your doctor.  Find ways to deal with stress, such as meditation, deep breathing, or yoga.  Try to exercise often. This can help lessen how bad and how often your migraines happen.  General instructions  Keep a journal to find out what may bring on your migraine headaches. This can help you avoid those things. For example, write down:  What you eat and drink.  How much sleep you get.  Any change to your medicines or diet.  If you have a migraine headache:  Avoid things that make your symptoms worse, such as bright lights.  Lie down in a dark, quiet room.  Do not drive or use machinery.  Ask your doctor what activities are safe for you.  Where to find more information  [employer]ition for Headache and Migraine Patients (CHAMP): headachemigraine.org  American Migraine Foundation: americanmigrainefoundation.org  National Headache Foundation: headaches.org  Contact a doctor if:  You get a migraine headache that is different or worse than others you have had.  You have more than 15 days of headaches in one month.  Get help right away if:  Your migraine headache gets very bad.  Your migraine headache lasts more than 72 hours.  You have a fever or stiff neck.  You have trouble seeing.  Your muscles feel weak or like you cannot control them.  You lose your balance a lot.  You have trouble walking.  You faint.  You have a seizure.  This information is not intended to replace advice given to you by your health care provider. Make sure you discuss any questions you have with your health care provider.  Document Revised: 08/14/2023 Document Reviewed:  08/14/2023  [employer]vier Patient Education © 2024 [employer]

## 2025-02-26 ENCOUNTER — PATIENT ROUNDING (BHMG ONLY) (OUTPATIENT)
Dept: INTERNAL MEDICINE | Facility: CLINIC | Age: 20
End: 2025-02-26
Payer: COMMERCIAL

## 2025-03-06 ENCOUNTER — OFFICE VISIT (OUTPATIENT)
Dept: INTERNAL MEDICINE | Facility: CLINIC | Age: 20
End: 2025-03-06
Payer: COMMERCIAL

## 2025-03-06 VITALS
DIASTOLIC BLOOD PRESSURE: 74 MMHG | SYSTOLIC BLOOD PRESSURE: 108 MMHG | HEART RATE: 70 BPM | WEIGHT: 223 LBS | HEIGHT: 74 IN | TEMPERATURE: 96.9 F | BODY MASS INDEX: 28.62 KG/M2 | OXYGEN SATURATION: 99 %

## 2025-03-06 DIAGNOSIS — G43.E09 CHRONIC MIGRAINE WITH AURA WITHOUT STATUS MIGRAINOSUS, NOT INTRACTABLE: Chronic | ICD-10-CM

## 2025-03-06 DIAGNOSIS — Z13.29 SCREENING FOR THYROID DISORDER: ICD-10-CM

## 2025-03-06 DIAGNOSIS — Z53.20 SCREENING FOR HEPATITIS C DECLINED: ICD-10-CM

## 2025-03-06 DIAGNOSIS — Z00.00 ANNUAL PHYSICAL EXAM: Primary | ICD-10-CM

## 2025-03-06 DIAGNOSIS — Z13.6 SCREENING FOR CARDIOVASCULAR CONDITION: ICD-10-CM

## 2025-03-06 DIAGNOSIS — D64.9 ANEMIA, UNSPECIFIED TYPE: Chronic | ICD-10-CM

## 2025-03-06 NOTE — PROGRESS NOTES
Chief Complaint  Annual Exam     Subjective:      History of Present Illness {CC  Problem List  Visit  Diagnosis   Encounters  Notes  Medications  Labs  Result Review Imaging  Media :23}     Mike Smith presents to [facility] for:    Patient or patient representative verbalized consent for the use of Ambient Listening during the visit with  JEWEL Enriquez for chart documentation. 3/19/2025  15:49 EST     History of Present Illness      The patient is here today for an annual physical.    He does not want to do his tetanus shot today, but we will reschedule it another time. He reports no recent episodes of fatigue or tiredness. His last dental visit was in 2024, with a typical frequency of one visit per year. He maintains oral hygiene by brushing his teeth once daily. His most recent ophthalmological examination was conducted several years ago, and he does not utilize corrective lenses such as glasses or contacts. He is currently covered under his mother's insurance policy. He does not engage in regular physical exercise. He reports no alterations in bowel habits, chronic constipation, or chronic diarrhea over the past year. He has never undergone a colonoscopy. He reports no urinary issues or voiding difficulties. He is not sexually active and therefore, there is no need for STI or STD screening at this time. He reports no current health concerns, including ear, nose, or throat issues. He has not previously used over-the-counter magnesium supplements. He reports no back problems or shortness of breath. He also reports no recent hand swelling or edema, and his shoes do not feel excessively tight in the morning. He reports no presence of lumps or bumps.    SOCIAL HISTORY  He is currently single and unemployed, and is not enrolled in any educational institution. He smokes but not cigarettes.    FAMILY HISTORY  His maternal grandfather had diabetes when he was  older. There is no family history of heart disease, liver disease, stroke, diabetes, or cancers.    IMMUNIZATIONS  He declined the Tdap vaccine today.     Mike is here for coordination of medical care, to discuss health maintenance, disease prevention as well as to followup on medical problems.     Past Medical History:   Diagnosis Date    Anxiety     Depression      Family History   Problem Relation Age of Onset    Anxiety disorder Mother     Depression Mother     Mental illness Mother     Anxiety disorder Sister     Depression Sister     Mental illness Sister     Anxiety disorder Brother     Depression Brother     Mental illness Brother     Diabetes Maternal Grandfather     Heart disease Maternal Grandfather     Hyperlipidemia Maternal Grandfather     Kidney disease Maternal Grandfather     Liver disease Maternal Grandfather     Heart disease Paternal Grandfather      Social History     Tobacco Use   Smoking Status Never   Smokeless Tobacco Never     Social History     Substance and Sexual Activity   Alcohol Use Not Currently     Patient Care Team:  Tamika Trivedi APRN as PCP - General (Family Medicine)     Social:  Marital status: single. He feels safe at home  Employment: unemployed.    Dental health: Dental visits aprox 1 times a year .last year last apt   Brushes teeth 1 times a day, flosses Occasionally .     Vision correction: not needed. Years ago   Hearing: normal.    Activity level is minimal.   Exercises 0 per week.     Health and Weight:   Weight trend is   Wt Readings from Last 4 Encounters:   03/06/25 101 kg (223 lb)   02/24/25 100 kg (221 lb)   05/13/24 79.4 kg (175 lb)       BMI is >= 25 and <30. (Overweight) The following options were offered after discussion;: none (medical contraindication)      Mental Health Check:   Depression screen: PHQ-2 Total Score:    PHQ-9 Total Score:       Blood Pressure: (Goal BP is SBP less than 140 / DBP less than 90)  BP Readings from Last 3 Encounters:    03/06/25 108/74   02/24/25 118/80   05/13/24 123/81        Cholesterol Screen:   Most men start screen at 35, if you have family history or comorbidities it may be screen earlier.    The ASCVD Risk score (Logan ORTEGA, et al., 2019) failed to calculate for the following reasons:    The 2019 ASCVD risk score is only valid for ages 40 to 79     Risk Evaluation:  1. Cardiovascular risk factors: family history of premature CAD, male gender.  2. Diabetes risk factors: none.   3. Cancer risk factors: no risk factors for cancer.  4. Hepatitis  C screen: [x] Due  [] Completed :   5. Dexa scan (65 for women and 70 for men with fractures)    Colon Cancer Screen: (Screening starts 44 y/o)  He has no change in bowel habits.   Patient's last colonoscopy was Never.   He is advised to repeat in Patient declines due to age.  Family history: [x] None    [] Yes:     Genital/ Urinary Health:   He voids without difficulty.  He is not sexually active.    STI Screen:   [] HIV     [] Syphilis   [] Chlamydia/ Gonorrhea   [x] Declines STD screen  If tests ordered, patient was informed HIV results will not show up on my chart.     Testicular cancer Screen:   Testicular self exams recommended once a month.   Advised that any firm testicular nodules to be reported immediately.    Prostate cancer screening: (Screening starts 50-70 y/o, family hx age 45)  PSA was discussed and declined He has no increased risk of prostate cancer.   Family history: [x] None    [] Yes:   PSA ordered today: [x] No    [] Yes    Lung Cancer Screen: (Screening starts 50-79 y/o, if current or former smoke quit in past 15 years, 20 or more pack-years of smoking, continue to screen until age 80 or 15 years from quit date)   Low Dose CT scan is ordered as screening:   [x] Nonsmoker  [] History of smoking  [] Current    Abdominal Aortic Aneurysm:   Age between 65 - 75 years of age that has ever smoked.   [x] N/A       [] Advised     Vaccines Due:   [] Prevnar 20     []  Flu  [] Shingrix (shingles: series of 2)   [] TDap  [] COVID (booster)     [] RSV  [] HPV series       [x] Declines vaccines today    Skin Cancer:   Regular Sunsceen: Advised  Routine self assessment of your skin, report any changes.   Always where sunglass when outside with UV protection      I have reviewed patient's medical history, any new submitted information provided by patient or medical assistant and updated medical record.      Objective:      Physical Exam  Vitals reviewed.   Constitutional:       General: He is awake. He is not in acute distress.     Appearance: Normal appearance. He is well-groomed. He is not ill-appearing, toxic-appearing or diaphoretic.   HENT:      Head: Normocephalic.      Right Ear: Hearing normal.      Left Ear: Hearing normal.      Nose: Nose normal.      Mouth/Throat:      Lips: Pink.      Mouth: Mucous membranes are moist.   Eyes:      General: Lids are normal. Vision grossly intact.         Right eye: No foreign body, discharge or hordeolum.         Left eye: No foreign body, discharge or hordeolum.      Extraocular Movements: Extraocular movements intact.      Conjunctiva/sclera: Conjunctivae normal.      Pupils: Pupils are equal, round, and reactive to light. Pupils are equal.   Neck:      Thyroid: No thyroid mass, thyromegaly or thyroid tenderness.      Vascular: No carotid bruit or JVD.      Trachea: Trachea and phonation normal.   Cardiovascular:      Rate and Rhythm: Normal rate and regular rhythm.      Pulses: Normal pulses.      Heart sounds: Normal heart sounds, S1 normal and S2 normal. No murmur heard.     No friction rub. No gallop.   Pulmonary:      Effort: Pulmonary effort is normal. No respiratory distress.      Breath sounds: Normal breath sounds. No stridor, decreased air movement or transmitted upper airway sounds. No decreased breath sounds, wheezing, rhonchi or rales.   Chest:      Chest wall: No tenderness.   Abdominal:      General: Bowel sounds are  normal. There is no distension.      Palpations: Abdomen is soft. There is no hepatomegaly, splenomegaly, mass or pulsatile mass.      Tenderness: There is no abdominal tenderness. There is no right CVA tenderness, left CVA tenderness, guarding or rebound.      Hernia: No hernia is present.   Musculoskeletal:         General: No swelling, tenderness, deformity or signs of injury. Normal range of motion.      Cervical back: Normal, full passive range of motion without pain and normal range of motion. No rigidity or tenderness.      Thoracic back: Normal.      Lumbar back: Normal.      Right lower leg: No edema.      Left lower leg: No edema.   Lymphadenopathy:      Head:      Right side of head: No submental, submandibular, tonsillar or preauricular adenopathy.      Left side of head: No submental, submandibular, tonsillar or preauricular adenopathy.      Cervical: No cervical adenopathy.   Skin:     General: Skin is warm and dry.      Capillary Refill: Capillary refill takes less than 2 seconds.      Coloration: Skin is not jaundiced or pale.      Findings: No bruising, erythema, lesion or rash.   Neurological:      General: No focal deficit present.      Mental Status: He is alert and oriented to person, place, and time. Mental status is at baseline.      Motor: No weakness.      Gait: Gait normal.   Psychiatric:         Attention and Perception: Attention and perception normal.         Mood and Affect: Mood and affect normal.         Speech: Speech normal.         Behavior: Behavior normal. Behavior is cooperative.         Thought Content: Thought content normal.         Cognition and Memory: Cognition and memory normal.         Judgment: Judgment normal.        Result Review  Data Reviewed:{ Labs  Result Review  Imaging  Med Tab  Media :23}     Results         The following data was reviewed by: JEWEL Enriquez on 03/06/2025  CMP          5/13/2024    19:01 3/6/2025    16:08   CMP   Glucose 86  83   "  BUN 9  13    Creatinine 0.80  0.99    EGFR 129.1  111.1    Sodium 140  139    Potassium 3.2  4.3    Chloride 104  100    Calcium 9.7  10.5    Total Protein 7.6  8.2    Albumin 5.1  5.1    Globulin 2.5  3.1    Total Bilirubin 1.6  1.2    Alkaline Phosphatase 64  89    AST (SGOT) 16  40    ALT (SGPT) 15  64    Albumin/Globulin Ratio 2.0  1.6    BUN/Creatinine Ratio 11.3  13.1    Anion Gap 14.0       CBC          5/13/2024    19:01 3/6/2025    16:08   CBC   WBC 7.24  6.61    RBC 4.86  5.70    Hemoglobin 15.7  18.3    Hematocrit 44.5  51.7    MCV 91.6  90.7    MCH 32.3  32.1    MCHC 35.3  35.4    RDW 11.7  11.2    Platelets 288  275      CBC w/diff          5/13/2024    19:01   CBC w/Diff   WBC 7.24    RBC 4.86    Hemoglobin 15.7    Hematocrit 44.5    MCV 91.6    MCH 32.3    MCHC 35.3    RDW 11.7    Platelets 288    Neutrophil Rel % 69.1    Immature Granulocyte Rel % 0.3    Lymphocyte Rel % 23.1    Monocyte Rel % 6.5    Eosinophil Rel % 0.4    Basophil Rel % 0.6      Lipid Panel          3/6/2025    16:08   Lipid Panel   Total Cholesterol 231    Triglycerides 75    HDL Cholesterol 70    VLDL Cholesterol 13    LDL Cholesterol  148      TSH          3/6/2025    16:08   TSH   TSH 1.080               Vital Signs:   /74 (BP Location: Left arm, Patient Position: Sitting, Cuff Size: Adult)   Pulse 70   Temp 96.9 °F (36.1 °C) (Temporal)   Ht 188 cm (74.02\")   Wt 101 kg (223 lb)   SpO2 99%   BMI 28.62 kg/m²             Requested Prescriptions      No prescriptions requested or ordered in this encounter       Routine medications provided by this office will also be refilled via pharmacy request.       Current Outpatient Medications:     ARIPiprazole (ABILIFY) 10 MG tablet, Take 1 tablet by mouth Daily., Disp: , Rfl:     busPIRone (BUSPAR) 15 MG tablet, Take 1 tablet by mouth Daily., Disp: , Rfl:     SUMAtriptan (Imitrex) 25 MG tablet, Take one tablet at onset of headache. May repeat dose one time in 2 hours if " headache not relieved., Disp: 30 tablet, Rfl: 3    vilazodone (VIIBRYD) 10 MG tablet tablet, Take 1 tablet by mouth Daily., Disp: , Rfl:      Assessment and Plan:      Assessment and Plan {CC Problem List  Visit Diagnosis  ROS  Review (Popup)  [facility]  Quality  BestPractice  Medications  SmartSets  SnapShot Encounters  Media :23}     Problem List Items Addressed This Visit       Chronic migraine with aura without status migrainosus, not intractable    Overview   Referral to neurology placed today         Annual physical exam - Primary    Current Assessment & Plan   Current recommendations according to the current Physical Activity Guidelines for Americans: adults need 150-300 minutes of physical exercise weekly. It is also recommended to perform two sessions of full body strength training exercise weekly which includes all major muscle groups including legs, hips, back, abdomen, chest, shoulders, and arms.   Current CDC recommendations for diet include following a diet that emphasizes fruits, vegetables, whole grains that is low in saturated fats and low in sugar intake.   Adults should consume at least 3 cup equivalents of fruit and vegetables daily. It is also beneficial to get 25 grams of fiber daily unless told otherwise by your healthcare provider    Anticipatory guidance given regarding health prevention/wellness, diet/exercise, tobacco/alcohol/drug education, exercise and wellbeing, covid 19 guidance, vaccination recommendations, and sexual health/STD education.   Recommended bi-yearly dental exams and regular vision examinations.           Relevant Orders    Comprehensive Metabolic Panel (Completed)    CBC & Differential (Completed)     Other Visit Diagnoses         Anemia, unspecified type  (Chronic)       Relevant Orders    Vitamin B12 (Completed)    Folate (Completed)      Screening for hepatitis C declined        Relevant Orders    Hepatitis C antibody (Completed)      Screening  for cardiovascular condition        Relevant Orders    Lipid Panel (Completed)      Screening for thyroid disorder        Relevant Orders    TSH Rfx On Abnormal To Free T4 (Completed)                 1. Annual physical examination.  He is due for his Tdap vaccine, which will be deferred to a later date. He is also due for HPV vaccine. He is not sexually active, so there is no need for STI or STD screening at this time. There is no family history of prostate cancer, so it is not a concern at this time. A hepatitis screening will be conducted today, along with other lab work. A B12 and folate lab test will be ordered to rule out any deficiencies. He has been advised to consult with an ophthalmologist for a vision check, particularly in light of his recent headaches. He has been recommended to start a daily regimen of over-the-counter magnesium supplements. He has been informed that he will be contacted with the results of his lab work. If any abnormalities are detected, he will be asked to return for a follow-up visit in a few months for repeat lab work.    Follow-up  The patient is scheduled for a follow-up visit in 1 year, or sooner if any health issues arise.       Patient Counseling:    Diet:    Eat vegetables, fruits, whole grain, low-fat dairy, poultry, fish, beans, nontropical vegetable oils, and nuts, but avoid red meat (i.e., Mediterranean-style diet, DASH [Dietary Approaches to Stop Hypertension] diet).  Limit sugary drinks and sweets.  Limit saturated and trans fat to 5% to 6% of calories.  Limit sodium intake to 2,400 mg daily (about one teaspoon table salt [kosher/sea salt have less sodium per teaspoon]).    Exercise:   Engage in moderate-to-vigorous aerobic activity for at least 40 minutes (on average) three to four times each week.    Weight loss / Calorie Counting Apps:    Lose It!   MyFitness Pal   Works great when you try it with a partner/ friend    Wearables:   Activity tracker   Step tracker      Skin Care:  Practice Safe Sun  Use sun screen SPF >30 daily  Protect your eyes with sunglasses   Dermatologist for skin check regularly    Bone Health:   https://www.nof.org/patients/treatment/nutrition/    Substance Abuse:   Discussed cessation/primary prevention of tobacco, alcohol, or other drug use; driving or other dangerous activities under the influence; availability of treatment for abuse.     Sexuality:   Discussed sexually transmitted diseases, partner selection, use of condoms, avoidance of unintended pregnancy, sexuality  and contraceptive alternatives.     Injury prevention:   Discussed safety belts, safety helmets, smoke detector, smoking near bedding or upholstery.     Dental health:   Discussed importance of regular tooth brushing, flossing, and dental visits.    Colon Cancer screen (discussed benefits of screening colonoscopy)  Should usually start at 45 yoa.     Immunizations reviewed  Recommend Shingles vaccine at age 50 (Shingrix which is 2 doses)     Living Will  https://ag.[place].gov/publications/AG%20Publications/livingwillpacket.pdf    Please complete your lab work today. Following review of results our office will be in contact with you for follow up care, medication changes or further instructions if needed. At that time if we need to schedule a follow up appointment one will be made at the time of the call.  Thank you for allowing us to care for you,  JEWEL Enriquez    Follow Up {Instructions Charge Capture  Follow-up Communications :23}     Return in about 1 year (around 3/6/2026) for Annual physical.      Patient was given instructions and counseling regarding his condition or for health maintenance advice. Please see specific information pulled into the AVS if appropriate.        Dragon disclaimer:   Much of this encounter note is an electronic transcription/translation of spoken language to printed text. The electronic translation of spoken language may permit erroneous, or  at times, nonsensical words or phrases to be inadvertently transcribed; Although I have reviewed the note for such errors, some may still exist.     Additional Patient Counseling:       There are no Patient Instructions on file for this visit.

## 2025-03-07 DIAGNOSIS — R74.8 ELEVATED LIVER ENZYMES: Primary | ICD-10-CM

## 2025-03-07 DIAGNOSIS — E78.00 ELEVATED CHOLESTEROL: ICD-10-CM

## 2025-03-07 LAB
ALBUMIN SERPL-MCNC: 5.1 G/DL (ref 3.5–5.2)
ALBUMIN/GLOB SERPL: 1.6 G/DL
ALP SERPL-CCNC: 89 U/L (ref 39–117)
ALT SERPL-CCNC: 64 U/L (ref 1–41)
AST SERPL-CCNC: 40 U/L (ref 1–40)
BASOPHILS # BLD AUTO: 0.06 10*3/MM3 (ref 0–0.2)
BASOPHILS NFR BLD AUTO: 0.9 % (ref 0–1.5)
BILIRUB SERPL-MCNC: 1.2 MG/DL (ref 0–1.2)
BUN SERPL-MCNC: 13 MG/DL (ref 6–20)
BUN/CREAT SERPL: 13.1 (ref 7–25)
CALCIUM SERPL-MCNC: 10.5 MG/DL (ref 8.6–10.5)
CHLORIDE SERPL-SCNC: 100 MMOL/L (ref 98–107)
CHOLEST SERPL-MCNC: 231 MG/DL (ref 0–200)
CO2 SERPL-SCNC: 23.7 MMOL/L (ref 22–29)
CREAT SERPL-MCNC: 0.99 MG/DL (ref 0.76–1.27)
EGFRCR SERPLBLD CKD-EPI 2021: 111.1 ML/MIN/1.73
EOSINOPHIL # BLD AUTO: 0.23 10*3/MM3 (ref 0–0.4)
EOSINOPHIL NFR BLD AUTO: 3.5 % (ref 0.3–6.2)
ERYTHROCYTE [DISTWIDTH] IN BLOOD BY AUTOMATED COUNT: 11.2 % (ref 12.3–15.4)
FOLATE SERPL-MCNC: 16.5 NG/ML (ref 4.78–24.2)
GLOBULIN SER CALC-MCNC: 3.1 GM/DL
GLUCOSE SERPL-MCNC: 83 MG/DL (ref 65–99)
HCT VFR BLD AUTO: 51.7 % (ref 37.5–51)
HCV IGG SERPL QL IA: NON REACTIVE
HDLC SERPL-MCNC: 70 MG/DL (ref 40–60)
HGB BLD-MCNC: 18.3 G/DL (ref 13–17.7)
IMM GRANULOCYTES # BLD AUTO: 0.02 10*3/MM3 (ref 0–0.05)
IMM GRANULOCYTES NFR BLD AUTO: 0.3 % (ref 0–0.5)
LDLC SERPL CALC-MCNC: 148 MG/DL (ref 0–100)
LYMPHOCYTES # BLD AUTO: 2.02 10*3/MM3 (ref 0.7–3.1)
LYMPHOCYTES NFR BLD AUTO: 30.6 % (ref 19.6–45.3)
MCH RBC QN AUTO: 32.1 PG (ref 26.6–33)
MCHC RBC AUTO-ENTMCNC: 35.4 G/DL (ref 31.5–35.7)
MCV RBC AUTO: 90.7 FL (ref 79–97)
MONOCYTES # BLD AUTO: 0.44 10*3/MM3 (ref 0.1–0.9)
MONOCYTES NFR BLD AUTO: 6.7 % (ref 5–12)
NEUTROPHILS # BLD AUTO: 3.84 10*3/MM3 (ref 1.7–7)
NEUTROPHILS NFR BLD AUTO: 58 % (ref 42.7–76)
NRBC BLD AUTO-RTO: 0 /100 WBC (ref 0–0.2)
PLATELET # BLD AUTO: 275 10*3/MM3 (ref 140–450)
POTASSIUM SERPL-SCNC: 4.3 MMOL/L (ref 3.5–5.2)
PROT SERPL-MCNC: 8.2 G/DL (ref 6–8.5)
RBC # BLD AUTO: 5.7 10*6/MM3 (ref 4.14–5.8)
SODIUM SERPL-SCNC: 139 MMOL/L (ref 136–145)
TRIGL SERPL-MCNC: 75 MG/DL (ref 0–150)
TSH SERPL DL<=0.005 MIU/L-ACNC: 1.08 UIU/ML (ref 0.27–4.2)
VIT B12 SERPL-MCNC: 798 PG/ML (ref 211–946)
VLDLC SERPL CALC-MCNC: 13 MG/DL (ref 5–40)
WBC # BLD AUTO: 6.61 10*3/MM3 (ref 3.4–10.8)

## 2025-03-07 NOTE — PROGRESS NOTES
Mr. Smith,  I reviewed your lab work.  Your cholesterol is elevated. LDL goal is under 100. Triglyceride goal is under 150. I recommend following a lower saturated fat and lower refined carbohydrate diet.  Please work on dietary changes such as reducing added sugars, decreasing saturated fats, and increase foods with omega-3 fatty acids. Also, engaging in 30 minutes of exercise 5 times per week can improve your cholesterol.     Your hepatitis C screen is negative.  And your vitamin B12 and folate levels are normal.  Your liver enzymes are slightly elevate. Routine exercise as tolerated, avoid NSAIDs: ibuprofen, naproxen (Aleve), Mortrin, Advil, decrease or abstain from alcohol are all recommended ways to lower your liver enzymes.    We will monitor these number in three months time. Please schedule a lab appointment for 6/7/25 or around that date to recheck these numbers.    Please let us know if you have any further questions or concerns.  Thank you for allowing us to care for you,  JEWEL Enriquez

## 2025-03-26 ENCOUNTER — HOSPITAL ENCOUNTER (OUTPATIENT)
Dept: MRI IMAGING | Facility: HOSPITAL | Age: 20
Discharge: HOME | End: 2025-03-26
Payer: COMMERCIAL

## 2025-03-26 DIAGNOSIS — G43.E09 CHRONIC MIGRAINE WITH AURA WITHOUT STATUS MIGRAINOSUS, NOT INTRACTABLE: ICD-10-CM

## 2025-03-26 PROCEDURE — 70551 MRI BRAIN STEM W/O DYE: CPT

## 2025-05-10 ENCOUNTER — HOSPITAL ENCOUNTER (OUTPATIENT)
Dept: MRI IMAGING | Facility: HOSPITAL | Age: 20
Discharge: HOME | End: 2025-05-10
Payer: COMMERCIAL

## 2025-05-10 DIAGNOSIS — K11.1 ENLARGED PAROTID GLAND: ICD-10-CM

## 2025-05-10 PROCEDURE — A9577 INJ MULTIHANCE: HCPCS

## 2025-05-10 PROCEDURE — 70543 MRI ORBT/FAC/NCK W/O &W/DYE: CPT

## 2025-05-10 PROCEDURE — 25510000002 GADOBENATE DIMEGLUMINE 529 MG/ML SOLUTION

## 2025-05-10 RX ADMIN — GADOBENATE DIMEGLUMINE 20 ML: 529 INJECTION, SOLUTION INTRAVENOUS at 14:35

## 2025-07-08 ENCOUNTER — HOSPITAL ENCOUNTER (EMERGENCY)
Facility: HOSPITAL | Age: 20
Discharge: LEFT BEFORE DISCHARGE | End: 2025-07-08
Payer: COMMERCIAL

## 2025-07-08 VITALS
HEART RATE: 95 BPM | OXYGEN SATURATION: 97 % | TEMPERATURE: 98 F | SYSTOLIC BLOOD PRESSURE: 118 MMHG | RESPIRATION RATE: 16 BRPM | DIASTOLIC BLOOD PRESSURE: 73 MMHG

## 2025-07-08 PROCEDURE — 99211 OFF/OP EST MAY X REQ PHY/QHP: CPT

## 2025-07-08 NOTE — ED TRIAGE NOTES
Pt reports rt hip pain since last week, seen at [facility] and had xray, returned to work and pain continues

## 2025-07-09 ENCOUNTER — TELEPHONE (OUTPATIENT)
Dept: INTERNAL MEDICINE | Facility: CLINIC | Age: 20
End: 2025-07-09